# Patient Record
Sex: FEMALE | Race: WHITE | URBAN - METROPOLITAN AREA
[De-identification: names, ages, dates, MRNs, and addresses within clinical notes are randomized per-mention and may not be internally consistent; named-entity substitution may affect disease eponyms.]

---

## 2018-03-25 ENCOUNTER — HOSPITAL ENCOUNTER (EMERGENCY)
Facility: CLINIC | Age: 28
Discharge: HOME | End: 2018-03-25
Attending: NURSE PRACTITIONER | Admitting: NURSE PRACTITIONER
Payer: COMMERCIAL

## 2018-03-25 ENCOUNTER — APPOINTMENT (OUTPATIENT)
Dept: CT IMAGING | Facility: CLINIC | Age: 28
End: 2018-03-25
Attending: NURSE PRACTITIONER
Payer: COMMERCIAL

## 2018-03-25 VITALS
HEIGHT: 67 IN | RESPIRATION RATE: 16 BRPM | WEIGHT: 215 LBS | HEART RATE: 89 BPM | OXYGEN SATURATION: 98 % | BODY MASS INDEX: 33.74 KG/M2 | TEMPERATURE: 98.1 F | DIASTOLIC BLOOD PRESSURE: 72 MMHG | SYSTOLIC BLOOD PRESSURE: 145 MMHG

## 2018-03-25 DIAGNOSIS — S06.0X0A CONCUSSION WITHOUT LOSS OF CONSCIOUSNESS, INITIAL ENCOUNTER: ICD-10-CM

## 2018-03-25 DIAGNOSIS — S09.90XA CLOSED HEAD INJURY, INITIAL ENCOUNTER: ICD-10-CM
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
## 2018-03-25 PROCEDURE — 70486 CT MAXILLOFACIAL W/O DYE: CPT

## 2018-03-25 PROCEDURE — 99284 EMERGENCY DEPT VISIT MOD MDM: CPT | Mod: 25

## 2018-03-25 RX ORDER — CETIRIZINE HYDROCHLORIDE 10 MG/1
10 TABLET ORAL DAILY
COMMUNITY

## 2018-03-25 ASSESSMENT — ENCOUNTER SYMPTOMS
VOMITING: 0
NECK PAIN: 0
PHOTOPHOBIA: 1
HEADACHES: 1
NAUSEA: 1
CONFUSION: 1
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
## 2018-03-25 NOTE — DISCHARGE INSTRUCTIONS
"Concussion Discharge Instructions  You were seen today for signs of a concussion. The symptoms will vary, depending on the nature of your injury and your health. You may have: headache, confusion, nausea (feel sick to your stomach), vomiting (throwing up) and problems with memory, concentrating or sleep. You may feel dizzy, irritable, and tired.   Children and teens may need help from their parents, teachers and coaches to watch for symptoms as they recover.  Follow-up  It is important for you to see a doctor for follow-up care to see how you are recovering. Please see your primary doctor within the next 5 to 7 days. You may have also been referred to the Concussion  service. They will contact you and arrange a follow-up visit if needed. If you need help sooner, you may call them at [phone].  Warning signs  Call your doctor or come back to Emergency if you suddenly have any of these symptoms:    Headaches that get worse    Feeling more and more drowsy    You keep repeating yourself    Strange behavior    Seizures    Repeat vomiting (throwing up)    Trouble walking    Growing confusion    Feeling more irritable    Neck pain that gets worse    Slurred speech    Weakness or numbness    Loss of consciousness    Fluid or blood coming from ears or nose  Self-care    Get lots of rest and get enough sleep at night. Take daytime naps or rest if you feel tired.    Limit physical activity and \"thinking\" activities. These can make symptoms worse.    Physical activity includes gym, sports, weight training, running, exercise and heavy lifting.    Thinking activities include homework, class work, job-related work and screen time (phone, computer, tablet, TV and video games).    Stick to a healthy diet and drink lots of fluids.    As symptoms improve, you may slowly return to your daily activities. If symptoms get worse   or return, reduce your activity.    Know that it is normal to feel sad and frustrated when you do " not feel right and are less active.  Going back to work    Your care team will tell you when you are ready to return to work.    Limit the amount of work you do soon after your injury. This may speed healing. Take breaks if your symptoms get worse. You should also reduce your physical activity as well as activities that require a lot of thinking until you see your doctor.    You may need shorter work days and a lighter workload.    Avoid heavy lifting, working with machinery, driving and working at heights until your symptoms are gone or you are cleared by a doctor.  Returning to sports    Never return to play if you have any symptoms. A full recovery will reduce the chances of getting hurt again. Remember, it is better to miss one or two games than a whole season.    You should rest from all physical activity until you see your doctor. Generally, if all symptoms have completely cleared, your doctor can help guide you to slowly return to sports. If symptoms return or worsen, stop the activity and see your doctor.    Important: If you are in an organized sport and under age 18, you will need written consent from a healthcare provider before you return to sports. Typically, this will be your primary care or sports medicine doctor. Please make an appointment.  Going back to school    If you are still having symptoms, you may need extra help at school.    Tell your teachers and school nurse about your injury and symptoms. Ask them to watch for problems with learning, memory and concentrating. Symptoms may get worse when you do schoolwork, and you may become more irritable.    You may need shorter school days, a reduced workload, and to postpone testing.    Do not drive or take gym class (physical activity) until cleared by a doctor.  For informational purposes only. Not to replace the advice of your health care provider.   2009 [employer]. Used with permission. This form is adapted  "from the \"Heads Up: Brain Injury in Your Practice\" tool kit developed by the Centers for Disease Control and Prevention (CDC). All rights reserved. [facility]. [employer] [id number] - Rev 03/17.    [facility]  What is a concussion?  A concussion is a mild traumatic brain injury (TBI) that occurs from a direct blow, bump or jolt to the head. It can also result from a blow to another part of the body when the force travels up to the head. A concussion can affect coordination, learning, memory and emotions.  Most concussions heal without issue. However, like any other injury, a brain injury should be given time to heal, which includes physical and mental rest (free from mental straining and visual stimuli like video jared and texting).  Signs and symptoms  Common signs and symptoms may include:    Altered mental status, including confusion, inappropriate emotions, agitation or abrupt change in personality    Amnesia/memory problems    Blurred vision, double vision, seeing stars or black spots    Dizziness, poor balance, or unsteadiness    Excessive or persistent headache    Excessive fatigue    Excessive sensitivity to light or loud noise    Feel  in a fog     Loss of consciousness or orientation    Poor balance/coordination    Ringing in ears    Vacant stare/glassy eyed    Vomiting  One of the most severe problems that can occur is bleeding inside the brain. If bleeding or swelling of the brain occurs, pressure in the skull rises and can cause brain injury. Bleeding may develop right after an injury or hours later, so monitoring symptoms is critical. This can be life threatening.    Why do a baseline computer test (ImPACT)?  Neurocognitive tests, such as ImPACT, provide information about how the brain is responding to injury. ImPACT has two components: a pre- and post-concussion test. The pretest scoring represents one s baseline (normal) brain function.  The test is then repeated " post injury. Results of the pre- and post-concussion tests are compared and a care plan is developed. If a pre-test was not completed prior to a concussion, a post-concussion test is still helpful in the assessment of brain function. An ImPACT test should be completed yearly due to the natural maturing of the brain.    What can I expect from the [place] concussion program? [facility] providers will:    Facilitate completion of baseline tests    Manage concussion symptoms and make recommendations for return to previous activity level    Facilitate post-concussion testing    Refer to other health-care providers, as needed,  including neuropsychologists, neurologists and therapists who specialize in concussion management    When is it safe to return to activity?  A person should be free of symptoms and have returned  to normal sleeping and eating patterns as well as typical concentration levels before resuming activity. Once normal activities have resumed and there are no symptoms at rest, more demanding activities may be tried. Over time, activity will be increased as long as symptoms do not return. A gradual return to activities allows us the opportunity to assess brain healing.  Under no circumstances should anyone return to activity while experiencing concussion signs or symptoms.    For more information contact:  Concussion hotline: [phone]  To schedule an appointment: [phone]  Cost of computer testing (ImPACT)  Pre-concussion testing - $5  Post-concussion testing:    Option 1: $20 includes all post testing; excludes doctor review.    Option 2: [facility] doctor visit, including all post testing and review.    [facility]  [address] [facility] [phone]   [facility]  [address]  [phone]   [facility]     [address] [phone]     [facility]     [address] [phone]      [facility]    [facility], [address] [facility]  [phone]       [facility]     [address] [phone]   [facility]    [address] [facility] [phone]   [facility]     [address] [facility] [phone]     [facility]   [address] [phone]     [facility]     [address] [facility] [phone]   [facility]:    [address] [facility] [phone]   [facility]:    [address] [facility] [phone]   [facility]   [address] [place] [address] [phone]     [facility]     [address] [facility] [phone]   [place] [facility]     [address] [facility] [phone]   [facility]     [address] [facility] [phone]   [facility]     [address] [facility] [phone]     Family planning and reproductive health centers  Centros de planificación familiar y jese reproductiva    [facility]              [phone]  [facility]               [phone]  [facility] [place]   [phone]  [facility] [place]   [phone]  [facility]  [phone]   [facility]                   [phone]

## 2018-03-25 NOTE — ED AVS SNAPSHOT
Emergency Department    [address] [place] [id number]    Phone:  [phone]    Fax:  [phone]                                       Suzie Lewis   MRN: [id number]    Department:   Emergency Department   Date of Visit:  3/25/2018           After Visit Summary Signature Page     I have received my discharge instructions, and my questions have been answered. I have discussed any challenges I see with this plan with the nurse or doctor.    ..........................................................................................................................................  Patient/Patient Representative Signature      ..........................................................................................................................................  Patient Representative Print Name and Relationship to Patient    ..................................................               ................................................  Date                                            Time    ..........................................................................................................................................  Reviewed by Signature/Title    ...................................................              ..............................................  Date                                                            Time